# Patient Record
Sex: MALE | Race: WHITE | NOT HISPANIC OR LATINO | Employment: OTHER | URBAN - NONMETROPOLITAN AREA
[De-identification: names, ages, dates, MRNs, and addresses within clinical notes are randomized per-mention and may not be internally consistent; named-entity substitution may affect disease eponyms.]

---

## 2023-07-06 ENCOUNTER — APPOINTMENT (OUTPATIENT)
Dept: PRIMARY CARE | Facility: CLINIC | Age: 28
End: 2023-07-06
Payer: COMMERCIAL

## 2024-02-10 ENCOUNTER — OFFICE VISIT (OUTPATIENT)
Dept: URGENT CARE | Facility: CLINIC | Age: 29
End: 2024-02-10
Payer: COMMERCIAL

## 2024-02-10 VITALS
DIASTOLIC BLOOD PRESSURE: 82 MMHG | BODY MASS INDEX: 37.79 KG/M2 | HEART RATE: 115 BPM | SYSTOLIC BLOOD PRESSURE: 143 MMHG | WEIGHT: 279 LBS | OXYGEN SATURATION: 97 % | TEMPERATURE: 97.3 F | HEIGHT: 72 IN

## 2024-02-10 DIAGNOSIS — J06.9 ACUTE UPPER RESPIRATORY INFECTION: Primary | ICD-10-CM

## 2024-02-10 LAB
POC TRIPLEX FLU A-AG: NORMAL
POC TRIPLEX FLU B-AG: NORMAL
POC TRIPLEX SARSCOV-2 AG: NORMAL

## 2024-02-10 PROCEDURE — 87428 SARSCOV & INF VIR A&B AG IA: CPT | Performed by: NURSE PRACTITIONER

## 2024-02-10 PROCEDURE — 99213 OFFICE O/P EST LOW 20 MIN: CPT | Mod: 25 | Performed by: NURSE PRACTITIONER

## 2024-02-10 RX ORDER — AZITHROMYCIN 250 MG/1
TABLET, FILM COATED ORAL
Qty: 6 TABLET | Refills: 0 | Status: SHIPPED | OUTPATIENT
Start: 2024-02-10 | End: 2024-02-15

## 2024-02-10 RX ORDER — LAMOTRIGINE 100 MG/1
TABLET ORAL
COMMUNITY
Start: 2024-01-30

## 2024-02-10 RX ORDER — ALBUTEROL SULFATE 90 UG/1
2 AEROSOL, METERED RESPIRATORY (INHALATION) EVERY 6 HOURS PRN
Qty: 18 G | Refills: 0 | Status: SHIPPED | OUTPATIENT
Start: 2024-02-10 | End: 2025-02-09

## 2024-02-10 NOTE — PROGRESS NOTES
28 y.o. male presents for evaluation of URI.  Symptoms including cough, congestion, body aches, malaise, and headache have been present for 1 days and refractory to OTC meds.  No fever, chills, nausea, vomiting, abdominal pain, CP, or SOB.  No exacerbating factors. No known COVID 19/flu exposure.        Vitals:    02/10/24 0809   BP: 143/82   Pulse: (!) 115   Temp: 36.3 °C (97.3 °F)   SpO2: 97%       Allergies   Allergen Reactions    Prednisone Anaphylaxis    Amoxicillin Rash       Medication Documentation Review Audit       Reviewed by Marycarmen David MA (Medical Assistant) on 02/10/24 at 0811      Medication Order Taking? Sig Documenting Provider Last Dose Status   lamoTRIgine (LaMICtal) 100 mg tablet [id number] Yes  Historical Provider, MD Taking Active   lumateperone (Caplyta) 42 mg capsule [id number] Yes Take by mouth. Historical Provider, MD Taking Active                    Past Medical History:   Diagnosis Date    Bipolar affective disorder (CMS/[facility])        Past Surgical History:   Procedure Laterality Date    OTHER SURGICAL HISTORY  12/30/2020    Root canal procedure       ROS  See HPI    Physical Exam  Vitals and nursing note reviewed.   Constitutional:       Appearance: Normal appearance.   HENT:      Head: Normocephalic and atraumatic.      Right Ear: Tympanic membrane, ear canal and external ear normal.      Left Ear: Tympanic membrane, ear canal and external ear normal.      Nose: Congestion present.      Mouth/Throat:      Mouth: Mucous membranes are moist.      Pharynx: Oropharynx is clear.   Eyes:      Extraocular Movements: Extraocular movements intact.      Conjunctiva/sclera: Conjunctivae normal.      Pupils: Pupils are equal, round, and reactive to light.   Cardiovascular:      Rate and Rhythm: Normal rate and regular rhythm.      Pulses: Normal pulses.      Heart sounds: Normal heart sounds.   Pulmonary:      Effort: Pulmonary effort is normal.      Breath sounds: Normal breath sounds.    Lymphadenopathy:      Cervical: No cervical adenopathy.   Skin:     General: Skin is warm.      Capillary Refill: Capillary refill takes less than 2 seconds.   Neurological:      General: No focal deficit present.      Mental Status: He is alert and oriented to person, place, and time.   Psychiatric:         Mood and Affect: Mood normal.         Behavior: Behavior normal.       Recent Results (from the past 1 hour(s))   POCT BD Veritor Triplex Ag    Collection Time: 02/10/24  9:06 AM   Result Value Ref Range    POC Triplex SARS-CoV-2 Ag  Presumptive negative for Triplex SARS-CoV-2 (no antigen detected)     Presumptive negative for Triplex SARS-CoV-2 (no antigen detected)    POC Triplex Flu A-Ag  Presumptive negative for Triplex FLU A (no antigen detected)     Presumptive negative for Triplex FLU A (no antigen detected)    POC Triplex Flu B-Ag  Presumptive negative for Triplex FLU B (no antigen detected)     Presumptive negative for Triplex FLU B (no antigen detected)       Assessment/Plan/MDM  [facility] was seen today for uri.  Diagnoses and all orders for this visit:  Acute upper respiratory infection (Primary)  -     azithromycin (Zithromax Z-Michele) 250 mg tablet; Take 2 tablets (500 mg) on  Day 1,  followed by 1 tablet (250 mg) once daily on Days 2 through 5.  -     albuterol 90 mcg/actuation inhaler; Inhale 2 puffs every 6 hours if needed for wheezing.  -     POCT BD Veritor Triplex Ag    Encouraged pt to continue otc cold remedies PRN, push by mouth fluids and rest. Patient's clinical presentation is otherwise unremarkable at this time. Patient is discharged with instructions to follow-up with primary care or seek emergency medical attention for worsening symptoms or any new concerns.    Enrique Arita, CNP  [facility]  [phone]

## 2024-04-06 ENCOUNTER — OFFICE VISIT (OUTPATIENT)
Dept: URGENT CARE | Facility: CLINIC | Age: 29
End: 2024-04-06
Payer: COMMERCIAL

## 2024-04-06 VITALS
TEMPERATURE: 98.1 F | OXYGEN SATURATION: 97 % | SYSTOLIC BLOOD PRESSURE: 149 MMHG | BODY MASS INDEX: 40.88 KG/M2 | HEIGHT: 71 IN | HEART RATE: 91 BPM | WEIGHT: 292 LBS | DIASTOLIC BLOOD PRESSURE: 88 MMHG

## 2024-04-06 DIAGNOSIS — H10.31 ACUTE BACTERIAL CONJUNCTIVITIS OF RIGHT EYE: Primary | ICD-10-CM

## 2024-04-06 PROCEDURE — 99213 OFFICE O/P EST LOW 20 MIN: CPT | Performed by: NURSE PRACTITIONER

## 2024-04-06 RX ORDER — OFLOXACIN 3 MG/ML
1 SOLUTION/ DROPS OPHTHALMIC 3 TIMES DAILY
Qty: 5 ML | Refills: 0 | Status: SHIPPED | OUTPATIENT
Start: 2024-04-06 | End: 2024-04-13

## 2024-04-06 ASSESSMENT — VISUAL ACUITY: OU: 1

## 2024-04-06 NOTE — PROGRESS NOTES
28 y.o. male presents for evaluation of irritation and drainage from right eye that began a few days ago.  Denies nasal congestion, sore throat, cough, headache, visual changes, fever or any other associated symptom or complaint.  No OTC meds or symptom management.  No other complaints.      Vitals:    04/06/24 1120   BP: 149/88   Pulse: 91   Temp: 36.7 °C (98.1 °F)   SpO2: 97%       Allergies   Allergen Reactions    Prednisone Anaphylaxis    Amoxicillin Rash       Medication Documentation Review Audit       Reviewed by Marycarmen David MA (Medical Assistant) on 04/06/24 at 1122      Medication Order Taking? Sig Documenting Provider Last Dose Status   albuterol 90 mcg/actuation inhaler [id number] No Inhale 2 puffs every 6 hours if needed for wheezing.   Patient not taking: Reported on 4/6/2024    Enrique Arita, APRN-CNP Not Taking Active   lamoTRIgine (LaMICtal) 100 mg tablet [id number] No  Historical Provider, MD Not Taking Active   lumateperone (Caplyta) 42 mg capsule [id number] No Take by mouth. Historical Provider, MD Not Taking Active                    Past Medical History:   Diagnosis Date    Bipolar affective disorder (CMS/[facility])        Past Surgical History:   Procedure Laterality Date    OTHER SURGICAL HISTORY  12/30/2020    Root canal procedure       ROS  See HPI    Physical Exam  Vitals and nursing note reviewed.   Constitutional:       General: He is not in acute distress.     Appearance: Normal appearance. He is not ill-appearing or toxic-appearing.   HENT:      Head: Normocephalic and atraumatic.      Right Ear: Tympanic membrane, ear canal and external ear normal.      Left Ear: Tympanic membrane, ear canal and external ear normal.      Nose: Nose normal.      Mouth/Throat:      Mouth: Mucous membranes are moist.      Pharynx: Oropharynx is clear.   Eyes:      General: Lids are normal. Vision grossly intact. No scleral icterus.        Right eye: Discharge (conjunctiva erythematous) present.         Left  eye: No discharge.      Extraocular Movements: Extraocular movements intact.      Pupils: Pupils are equal, round, and reactive to light.   Cardiovascular:      Rate and Rhythm: Normal rate and regular rhythm.      Pulses: Normal pulses.      Heart sounds: Normal heart sounds.   Pulmonary:      Effort: Pulmonary effort is normal.      Breath sounds: Normal breath sounds.   Lymphadenopathy:      Cervical: No cervical adenopathy.   Skin:     General: Skin is warm.      Capillary Refill: Capillary refill takes less than 2 seconds.   Neurological:      General: No focal deficit present.      Mental Status: He is alert and oriented to person, place, and time.   Psychiatric:         Mood and Affect: Mood normal.         Behavior: Behavior normal.         Assessment/Plan/MDM  Augustin was seen today for conjunctivitis.  Diagnoses and all orders for this visit:  Acute bacterial conjunctivitis of right eye (Primary)  -     ofloxacin (Ocuflox) 0.3 % ophthalmic solution; Administer 1 drop into both eyes 3 times a day for 7 days.    Encouraged pt to continue otc cold remedies PRN, push by mouth fluids and rest. Patient's clinical presentation is otherwise unremarkable at this time. Patient is discharged with instructions to follow-up with primary care or seek emergency medical attention for worsening symptoms or any new concerns.    I did personally review Augustin's past medical history, surgical history, social history, as well as family history (when relevant).  In this case, I also oversaw the his drug management by reviewing his medication list, allergy list, as well as the medications that I prescribed during the UC course and/or recommended as an out-patient (including possible OTC medications such as acetaminophen, NSAIDs , etc).    After reviewing the items above, I did not look at previous medical documentation, such as recent hospitalizations, office visits, and/or recent consultations with PCP/specialist.                           SDOH:   Another factor that I considered in [facility]'s care was his Social Determinants of Health (SDOH). During this UC encounter, he did not have social determinants of health. Those SDOH influencing [facility]'s care are: none      Enrique Arita CNP  [facility]  [phone]

## 2024-08-28 ENCOUNTER — APPOINTMENT (OUTPATIENT)
Age: 29
End: 2024-08-28
Payer: COMMERCIAL

## 2024-08-28 VITALS
BODY MASS INDEX: 41.93 KG/M2 | DIASTOLIC BLOOD PRESSURE: 70 MMHG | HEART RATE: 110 BPM | WEIGHT: 299.5 LBS | SYSTOLIC BLOOD PRESSURE: 150 MMHG | OXYGEN SATURATION: 99 %

## 2024-08-28 DIAGNOSIS — R53.83 FATIGUE, UNSPECIFIED TYPE: Primary | ICD-10-CM

## 2024-08-28 DIAGNOSIS — R35.0 URINARY FREQUENCY: ICD-10-CM

## 2024-08-28 DIAGNOSIS — Z00.00 HEALTHCARE MAINTENANCE: ICD-10-CM

## 2024-08-28 DIAGNOSIS — G47.30 SLEEP APNEA, UNSPECIFIED TYPE: ICD-10-CM

## 2024-08-28 PROBLEM — F31.9 BIPOLAR 1 DISORDER (MULTI): Status: ACTIVE | Noted: 2023-07-05

## 2024-08-28 PROCEDURE — 99214 OFFICE O/P EST MOD 30 MIN: CPT | Performed by: FAMILY MEDICINE

## 2024-08-28 RX ORDER — NALTREXONE HYDROCHLORIDE 50 MG/1
50 TABLET, FILM COATED ORAL DAILY
COMMUNITY
Start: 2024-08-01

## 2024-08-28 RX ORDER — LUMATEPERONE 21 MG/1
CAPSULE ORAL
COMMUNITY
Start: 2024-06-20 | End: 2024-08-28 | Stop reason: WASHOUT

## 2024-08-28 NOTE — PROGRESS NOTES
Subjective   Augustin Gonzalez is a 29 y.o. male who presents for Fatigue.  Here c/o fatigue.  He states that he is exhausted all the time.  He stopped drinking about 3 years ago.  He thought the fatigue was related to drinking but it did not improve and in the past few months he has had worsening fatigue.  He fell asleep at his desk the other day.  He continues to see his counselor regularly.  He has had some issues with increased urination as well.      His sleep is hit or miss - some nights he feels like he sleeps well, others not.  He states that he cannot sleep on his back or else he feels like he can't breath.              Objective   Visit Vitals  /70   Pulse 110      Physical Exam  Vitals reviewed.   Constitutional:       General: He is not in acute distress.  Cardiovascular:      Rate and Rhythm: Normal rate and regular rhythm.      Heart sounds: No murmur heard.  Pulmonary:      Effort: Pulmonary effort is normal. No respiratory distress.      Breath sounds: Normal breath sounds.   Skin:     General: Skin is warm and dry.   Neurological:      General: No focal deficit present.      Mental Status: He is alert. Mental status is at baseline.         Assessment/Plan   Problem List Items Addressed This Visit    None  Visit Diagnoses       Fatigue, unspecified type    -  Primary    Relevant Orders    CBC and Auto Differential    Comprehensive Metabolic Panel    Hemoglobin A1C    Lipid Panel    TSH with reflex to Free T4 if abnormal    Vitamin B12    Vitamin D 25-Hydroxy,Total (for eval of Vitamin D levels)    Home sleep apnea test (HSAT)    Urinary frequency        Relevant Orders    CBC and Auto Differential    Comprehensive Metabolic Panel    Hemoglobin A1C    Lipid Panel    TSH with reflex to Free T4 if abnormal    Vitamin B12    Vitamin D 25-Hydroxy,Total (for eval of Vitamin D levels)    Urinalysis with Reflex Microscopic    Urine Culture    Healthcare maintenance        Relevant Orders    CBC and Auto  Differential    Comprehensive Metabolic Panel    Hemoglobin A1C    Lipid Panel    TSH with reflex to Free T4 if abnormal    Vitamin B12    Vitamin D 25-Hydroxy,Total (for eval of Vitamin D levels)    Sleep apnea, unspecified type        Relevant Orders    Home sleep apnea test (HSAT)               Trudy Asher MD

## 2024-08-29 ENCOUNTER — LAB (OUTPATIENT)
Facility: LAB | Age: 29
End: 2024-08-29
Payer: COMMERCIAL

## 2024-08-29 DIAGNOSIS — Z00.00 HEALTHCARE MAINTENANCE: ICD-10-CM

## 2024-08-29 DIAGNOSIS — R53.83 FATIGUE, UNSPECIFIED TYPE: ICD-10-CM

## 2024-08-29 DIAGNOSIS — R35.0 URINARY FREQUENCY: ICD-10-CM

## 2024-08-29 LAB
25(OH)D3 SERPL-MCNC: 7 NG/ML (ref 30–100)
ALBUMIN SERPL BCP-MCNC: 4.9 G/DL (ref 3.4–5)
ALP SERPL-CCNC: 54 U/L (ref 33–120)
ALT SERPL W P-5'-P-CCNC: 63 U/L (ref 10–52)
ANION GAP SERPL CALC-SCNC: 13 MMOL/L (ref 10–20)
APPEARANCE UR: CLEAR
AST SERPL W P-5'-P-CCNC: 36 U/L (ref 9–39)
BASOPHILS # BLD AUTO: 0.06 X10*3/UL (ref 0–0.1)
BASOPHILS NFR BLD AUTO: 0.7 %
BILIRUB SERPL-MCNC: 0.6 MG/DL (ref 0–1.2)
BILIRUB UR STRIP.AUTO-MCNC: NEGATIVE MG/DL
BUN SERPL-MCNC: 11 MG/DL (ref 6–23)
CALCIUM SERPL-MCNC: 9.5 MG/DL (ref 8.6–10.3)
CHLORIDE SERPL-SCNC: 102 MMOL/L (ref 98–107)
CHOLEST SERPL-MCNC: 178 MG/DL (ref 0–199)
CHOLESTEROL/HDL RATIO: 4.3
CO2 SERPL-SCNC: 26 MMOL/L (ref 21–32)
COLOR UR: YELLOW
CREAT SERPL-MCNC: 1.04 MG/DL (ref 0.5–1.3)
EGFRCR SERPLBLD CKD-EPI 2021: >90 ML/MIN/1.73M*2
EOSINOPHIL # BLD AUTO: 0.15 X10*3/UL (ref 0–0.7)
EOSINOPHIL NFR BLD AUTO: 1.8 %
ERYTHROCYTE [DISTWIDTH] IN BLOOD BY AUTOMATED COUNT: 12.7 % (ref 11.5–14.5)
EST. AVERAGE GLUCOSE BLD GHB EST-MCNC: 91 MG/DL
GLUCOSE SERPL-MCNC: 96 MG/DL (ref 74–99)
GLUCOSE UR STRIP.AUTO-MCNC: NORMAL MG/DL
HBA1C MFR BLD: 4.8 %
HCT VFR BLD AUTO: 49.1 % (ref 41–52)
HDLC SERPL-MCNC: 41 MG/DL
HGB BLD-MCNC: 15.9 G/DL (ref 13.5–17.5)
IMM GRANULOCYTES # BLD AUTO: 0.03 X10*3/UL (ref 0–0.7)
IMM GRANULOCYTES NFR BLD AUTO: 0.4 % (ref 0–0.9)
KETONES UR STRIP.AUTO-MCNC: NEGATIVE MG/DL
LDLC SERPL CALC-MCNC: 110 MG/DL
LEUKOCYTE ESTERASE UR QL STRIP.AUTO: NEGATIVE
LYMPHOCYTES # BLD AUTO: 1.66 X10*3/UL (ref 1.2–4.8)
LYMPHOCYTES NFR BLD AUTO: 20.2 %
MCH RBC QN AUTO: 28.2 PG (ref 26–34)
MCHC RBC AUTO-ENTMCNC: 32.4 G/DL (ref 32–36)
MCV RBC AUTO: 87 FL (ref 80–100)
MONOCYTES # BLD AUTO: 0.56 X10*3/UL (ref 0.1–1)
MONOCYTES NFR BLD AUTO: 6.8 %
NEUTROPHILS # BLD AUTO: 5.74 X10*3/UL (ref 1.2–7.7)
NEUTROPHILS NFR BLD AUTO: 70.1 %
NITRITE UR QL STRIP.AUTO: NEGATIVE
NON HDL CHOLESTEROL: 137 MG/DL (ref 0–149)
NRBC BLD-RTO: 0 /100 WBCS (ref 0–0)
PH UR STRIP.AUTO: 6 [PH]
PLATELET # BLD AUTO: 279 X10*3/UL (ref 150–450)
POTASSIUM SERPL-SCNC: 4.1 MMOL/L (ref 3.5–5.3)
PROT SERPL-MCNC: 7.2 G/DL (ref 6.4–8.2)
PROT UR STRIP.AUTO-MCNC: NEGATIVE MG/DL
RBC # BLD AUTO: 5.63 X10*6/UL (ref 4.5–5.9)
RBC # UR STRIP.AUTO: NEGATIVE /UL
SODIUM SERPL-SCNC: 137 MMOL/L (ref 136–145)
SP GR UR STRIP.AUTO: 1.02
TRIGL SERPL-MCNC: 134 MG/DL (ref 0–149)
TSH SERPL-ACNC: 1.05 MIU/L (ref 0.44–3.98)
UROBILINOGEN UR STRIP.AUTO-MCNC: NORMAL MG/DL
VIT B12 SERPL-MCNC: 212 PG/ML (ref 211–911)
VLDL: 27 MG/DL (ref 0–40)
WBC # BLD AUTO: 8.2 X10*3/UL (ref 4.4–11.3)

## 2024-08-29 PROCEDURE — 36415 COLL VENOUS BLD VENIPUNCTURE: CPT

## 2024-08-29 PROCEDURE — 82306 VITAMIN D 25 HYDROXY: CPT

## 2024-08-29 PROCEDURE — 87086 URINE CULTURE/COLONY COUNT: CPT

## 2024-08-29 PROCEDURE — 80061 LIPID PANEL: CPT

## 2024-08-29 PROCEDURE — 82607 VITAMIN B-12: CPT

## 2024-08-29 PROCEDURE — 81003 URINALYSIS AUTO W/O SCOPE: CPT

## 2024-08-29 PROCEDURE — 85025 COMPLETE CBC W/AUTO DIFF WBC: CPT

## 2024-08-29 PROCEDURE — 80053 COMPREHEN METABOLIC PANEL: CPT

## 2024-08-29 PROCEDURE — 84443 ASSAY THYROID STIM HORMONE: CPT

## 2024-08-29 PROCEDURE — 83036 HEMOGLOBIN GLYCOSYLATED A1C: CPT

## 2024-08-30 LAB — BACTERIA UR CULT: NORMAL

## 2024-09-03 ENCOUNTER — TELEPHONE (OUTPATIENT)
Age: 29
End: 2024-09-03

## 2024-09-03 ENCOUNTER — CLINICAL SUPPORT (OUTPATIENT)
Dept: SLEEP MEDICINE | Facility: HOSPITAL | Age: 29
End: 2024-09-03
Payer: COMMERCIAL

## 2024-09-03 VITALS — HEIGHT: 70 IN | BODY MASS INDEX: 42.8 KG/M2 | WEIGHT: 299 LBS

## 2024-09-03 DIAGNOSIS — G47.30 SLEEP APNEA, UNSPECIFIED TYPE: Primary | ICD-10-CM

## 2024-09-03 DIAGNOSIS — G47.30 SLEEP APNEA, UNSPECIFIED TYPE: ICD-10-CM

## 2024-09-03 DIAGNOSIS — G47.9 SLEEP DISORDER, UNSPECIFIED: ICD-10-CM

## 2024-09-03 DIAGNOSIS — R53.83 FATIGUE, UNSPECIFIED TYPE: ICD-10-CM

## 2024-09-03 PROCEDURE — 9420000001 HC RT PATIENT EDUCATION 5 MIN

## 2024-09-03 PROCEDURE — 95806 SLEEP STUDY UNATT&RESP EFFT: CPT | Performed by: STUDENT IN AN ORGANIZED HEALTH CARE EDUCATION/TRAINING PROGRAM

## 2024-09-03 NOTE — TELEPHONE ENCOUNTER
An in lab sleep study order popped up for me to do prior auth on and it looks like the patient picked up a home sleep study today. Did the procedure change?

## 2024-09-03 NOTE — TELEPHONE ENCOUNTER
Sorry, I didn't get a forwarded message. He evidently can do the HST because he picked it up today so I'll go ahead and cancel the in lab order.

## 2024-09-03 NOTE — TELEPHONE ENCOUNTER
I thought I forwarded you the message I got from someone who reviews sleep study requests - they said that he could not do the home sleep study due to his insurance.  If he is able to do the home sleep study that is fine.

## 2024-09-03 NOTE — PROGRESS NOTES
Type of Study: HOME SLEEP STUDY - NOMAD     The patient received equipment and instructions for use of the [employer] Nomad HSAT device. The patient was instructed how to apply the effort belts, cannula, thermistor. It was also explained how the Nomad and oximeter components work.  The patient was asked to record their sleep for an 8-hour period.  The patient was informed of their responsibility for the device and acknowledged this by signing the HSAT device contract. The patient was asked to return the device on the next day and was shown where the drop off box was located in the hospital.     After the procedure, the patient/family was informed to follow up with ordering clinician for testing results.

## 2024-09-25 ENCOUNTER — APPOINTMENT (OUTPATIENT)
Age: 29
End: 2024-09-25
Payer: COMMERCIAL

## 2024-09-25 VITALS
BODY MASS INDEX: 42.25 KG/M2 | HEIGHT: 70 IN | HEART RATE: 80 BPM | DIASTOLIC BLOOD PRESSURE: 82 MMHG | WEIGHT: 295.1 LBS | OXYGEN SATURATION: 97 % | SYSTOLIC BLOOD PRESSURE: 140 MMHG

## 2024-09-25 DIAGNOSIS — E55.9 VITAMIN D DEFICIENCY: Primary | ICD-10-CM

## 2024-09-25 PROCEDURE — 4004F PT TOBACCO SCREEN RCVD TLK: CPT | Performed by: FAMILY MEDICINE

## 2024-09-25 PROCEDURE — 3008F BODY MASS INDEX DOCD: CPT | Performed by: FAMILY MEDICINE

## 2024-09-25 PROCEDURE — 99213 OFFICE O/P EST LOW 20 MIN: CPT | Performed by: FAMILY MEDICINE

## 2024-09-25 RX ORDER — ERGOCALCIFEROL 1.25 MG/1
50000 CAPSULE ORAL 2 TIMES WEEKLY
Qty: 24 CAPSULE | Refills: 1 | Status: SHIPPED | OUTPATIENT
Start: 2024-09-25

## 2024-09-25 NOTE — PROGRESS NOTES
Subjective   Augustin Gonzalez is a 29 y.o. male who presents for Follow-up (Follow up - sleep study/blood work).  Here for follow up recent labs, sleep study, fatigue.    Reviewed his labs - vitamin D level was significantly low - we will start supplement for that.  Otherwise labs were ok.  Reviewed his recent sleep study and while it did not confirm sleep apnea we discussed some limitations of the test and options including testing in lab or referral to sleep medicine doctor vs working on some lifestyle changes and at this point he would like to work on lifestyle changes first and will let us know if he is interested in further pursuing this.              Objective   Visit Vitals  /82 (BP Location: Right arm, Patient Position: Sitting)   Pulse 80      Physical Exam  Vitals reviewed.   Constitutional:       General: He is not in acute distress.  Cardiovascular:      Rate and Rhythm: Normal rate and regular rhythm.      Heart sounds: No murmur heard.  Pulmonary:      Effort: Pulmonary effort is normal. No respiratory distress.      Breath sounds: Normal breath sounds.   Skin:     General: Skin is warm and dry.   Neurological:      General: No focal deficit present.      Mental Status: He is alert. Mental status is at baseline.         Assessment/Plan   Problem List Items Addressed This Visit    None  Visit Diagnoses       Vitamin D deficiency    -  Primary    Relevant Medications    ergocalciferol (Vitamin D-2) 1.25 MG (89117 UT) capsule    Other Relevant Orders    Vitamin D 25-Hydroxy,Total (for eval of Vitamin D levels)               Trudy Asher MD

## 2024-09-25 NOTE — PATIENT INSTRUCTIONS
Will start vitamin D.  Also discussed nicotine cessation.  He has taken bupropion in the past and did not tolerate that.  Discussed chantix and he will think about it/discuss with his psychiatrist.  Also discussed nicotine replacement.  Follow up here in 3 months, sooner if needed.

## 2024-12-16 ENCOUNTER — HOSPITAL ENCOUNTER (EMERGENCY)
Facility: HOSPITAL | Age: 29
Discharge: HOME | End: 2024-12-16
Attending: EMERGENCY MEDICINE

## 2024-12-16 VITALS
BODY MASS INDEX: 39.28 KG/M2 | DIASTOLIC BLOOD PRESSURE: 91 MMHG | TEMPERATURE: 97.5 F | SYSTOLIC BLOOD PRESSURE: 136 MMHG | OXYGEN SATURATION: 98 % | RESPIRATION RATE: 18 BRPM | WEIGHT: 290 LBS | HEIGHT: 72 IN | HEART RATE: 84 BPM

## 2024-12-16 DIAGNOSIS — F32.A DEPRESSION, UNSPECIFIED DEPRESSION TYPE: Primary | ICD-10-CM

## 2024-12-16 LAB
ALBUMIN SERPL BCP-MCNC: 4.7 G/DL (ref 3.4–5)
ALP SERPL-CCNC: 64 U/L (ref 33–120)
ALT SERPL W P-5'-P-CCNC: 91 U/L (ref 10–52)
AMPHETAMINES UR QL SCN: NORMAL
ANION GAP SERPL CALC-SCNC: 14 MMOL/L (ref 10–20)
APAP SERPL-MCNC: <10 UG/ML
AST SERPL W P-5'-P-CCNC: 32 U/L (ref 9–39)
BARBITURATES UR QL SCN: NORMAL
BASOPHILS # BLD AUTO: 0.09 X10*3/UL (ref 0–0.1)
BASOPHILS NFR BLD AUTO: 0.9 %
BENZODIAZ UR QL SCN: NORMAL
BILIRUB SERPL-MCNC: 0.4 MG/DL (ref 0–1.2)
BUN SERPL-MCNC: 6 MG/DL (ref 6–23)
BZE UR QL SCN: NORMAL
CALCIUM SERPL-MCNC: 10.2 MG/DL (ref 8.6–10.3)
CANNABINOIDS UR QL SCN: NORMAL
CHLORIDE SERPL-SCNC: 102 MMOL/L (ref 98–107)
CO2 SERPL-SCNC: 23 MMOL/L (ref 21–32)
CREAT SERPL-MCNC: 0.9 MG/DL (ref 0.5–1.3)
EGFRCR SERPLBLD CKD-EPI 2021: >90 ML/MIN/1.73M*2
EOSINOPHIL # BLD AUTO: 0.08 X10*3/UL (ref 0–0.7)
EOSINOPHIL NFR BLD AUTO: 0.8 %
ERYTHROCYTE [DISTWIDTH] IN BLOOD BY AUTOMATED COUNT: 13.3 % (ref 11.5–14.5)
ETHANOL SERPL-MCNC: <10 MG/DL
FENTANYL+NORFENTANYL UR QL SCN: NORMAL
GLUCOSE SERPL-MCNC: 101 MG/DL (ref 74–99)
HCT VFR BLD AUTO: 46.8 % (ref 41–52)
HGB BLD-MCNC: 15.7 G/DL (ref 13.5–17.5)
IMM GRANULOCYTES # BLD AUTO: 0.06 X10*3/UL (ref 0–0.7)
IMM GRANULOCYTES NFR BLD AUTO: 0.6 % (ref 0–0.9)
LYMPHOCYTES # BLD AUTO: 1.87 X10*3/UL (ref 1.2–4.8)
LYMPHOCYTES NFR BLD AUTO: 18.8 %
MCH RBC QN AUTO: 28.5 PG (ref 26–34)
MCHC RBC AUTO-ENTMCNC: 33.5 G/DL (ref 32–36)
MCV RBC AUTO: 85 FL (ref 80–100)
METHADONE UR QL SCN: NORMAL
MONOCYTES # BLD AUTO: 0.67 X10*3/UL (ref 0.1–1)
MONOCYTES NFR BLD AUTO: 6.7 %
NEUTROPHILS # BLD AUTO: 7.18 X10*3/UL (ref 1.2–7.7)
NEUTROPHILS NFR BLD AUTO: 72.2 %
NRBC BLD-RTO: 0 /100 WBCS (ref 0–0)
OPIATES UR QL SCN: NORMAL
OXYCODONE+OXYMORPHONE UR QL SCN: NORMAL
PCP UR QL SCN: NORMAL
PLATELET # BLD AUTO: 297 X10*3/UL (ref 150–450)
POTASSIUM SERPL-SCNC: 4.1 MMOL/L (ref 3.5–5.3)
PROT SERPL-MCNC: 7.4 G/DL (ref 6.4–8.2)
RBC # BLD AUTO: 5.5 X10*6/UL (ref 4.5–5.9)
SALICYLATES SERPL-MCNC: <3 MG/DL
SODIUM SERPL-SCNC: 135 MMOL/L (ref 136–145)
WBC # BLD AUTO: 10 X10*3/UL (ref 4.4–11.3)

## 2024-12-16 PROCEDURE — 85025 COMPLETE CBC W/AUTO DIFF WBC: CPT | Performed by: EMERGENCY MEDICINE

## 2024-12-16 PROCEDURE — 80053 COMPREHEN METABOLIC PANEL: CPT | Performed by: EMERGENCY MEDICINE

## 2024-12-16 PROCEDURE — 36415 COLL VENOUS BLD VENIPUNCTURE: CPT | Performed by: EMERGENCY MEDICINE

## 2024-12-16 PROCEDURE — 80320 DRUG SCREEN QUANTALCOHOLS: CPT | Performed by: EMERGENCY MEDICINE

## 2024-12-16 PROCEDURE — 99283 EMERGENCY DEPT VISIT LOW MDM: CPT | Performed by: EMERGENCY MEDICINE

## 2024-12-16 PROCEDURE — 80307 DRUG TEST PRSMV CHEM ANLYZR: CPT | Performed by: EMERGENCY MEDICINE

## 2024-12-16 PROCEDURE — 99285 EMERGENCY DEPT VISIT HI MDM: CPT | Performed by: EMERGENCY MEDICINE

## 2024-12-16 SDOH — HEALTH STABILITY: MENTAL HEALTH: BEHAVIORS/MOOD: CALM;SAD;TEARFUL

## 2024-12-16 SDOH — HEALTH STABILITY: MENTAL HEALTH

## 2024-12-16 SDOH — HEALTH STABILITY: MENTAL HEALTH: HAVE YOU BEEN THINKING ABOUT HOW YOU MIGHT DO THIS?: YES

## 2024-12-16 SDOH — SOCIAL STABILITY: SOCIAL NETWORK: EMOTIONAL SUPPORT GIVEN: REASSURE

## 2024-12-16 SDOH — HEALTH STABILITY: MENTAL HEALTH: NEEDS EXPRESSED: EMOTIONAL

## 2024-12-16 SDOH — SOCIAL STABILITY: SOCIAL NETWORK: EMOTIONAL SUPPORT GIVEN: PATIENT COUNSELING

## 2024-12-16 SDOH — HEALTH STABILITY: MENTAL HEALTH: BEHAVIORAL HEALTH(WDL): EXCEPTIONS TO WDL

## 2024-12-16 SDOH — HEALTH STABILITY: MENTAL HEALTH: BEHAVIORS/MOOD: CALM;COOPERATIVE;WITHDRAWN

## 2024-12-16 SDOH — HEALTH STABILITY: MENTAL HEALTH: HAVE YOU EVER DONE ANYTHING, STARTED TO DO ANYTHING, OR PREPARED TO DO ANYTHING TO END YOUR LIFE?: NO

## 2024-12-16 SDOH — HEALTH STABILITY: MENTAL HEALTH
HAVE YOU STARTED TO WORK OUT OR WORKED OUT THE DETAILS OF HOW TO KILL YOURSELF? DO YOU INTENT TO CARRY OUT THIS PLAN?: YES

## 2024-12-16 SDOH — HEALTH STABILITY: MENTAL HEALTH: CONTENT: UNREMARKABLE

## 2024-12-16 SDOH — HEALTH STABILITY: MENTAL HEALTH: BEHAVIORS/MOOD: CALM;COOPERATIVE

## 2024-12-16 SDOH — HEALTH STABILITY: MENTAL HEALTH: HAVE YOU WISHED YOU WERE DEAD OR WISHED YOU COULD GO TO SLEEP AND NOT WAKE UP?: YES

## 2024-12-16 SDOH — HEALTH STABILITY: MENTAL HEALTH: SUICIDE ASSESSMENT: ADULT (C-SSRS)

## 2024-12-16 SDOH — HEALTH STABILITY: MENTAL HEALTH: HAVE YOU HAD THESE THOUGHTS AND HAD SOME INTENTION OF ACTING ON THEM?: YES

## 2024-12-16 SDOH — HEALTH STABILITY: MENTAL HEALTH: HAVE YOU ACTUALLY HAD ANY THOUGHTS OF KILLING YOURSELF?: YES

## 2024-12-16 ASSESSMENT — COLUMBIA-SUICIDE SEVERITY RATING SCALE - C-SSRS
1. IN THE PAST MONTH, HAVE YOU WISHED YOU WERE DEAD OR WISHED YOU COULD GO TO SLEEP AND NOT WAKE UP?: YES
4. HAVE YOU HAD THESE THOUGHTS AND HAD SOME INTENTION OF ACTING ON THEM?: YES
5. HAVE YOU STARTED TO WORK OUT OR WORKED OUT THE DETAILS OF HOW TO KILL YOURSELF? DO YOU INTEND TO CARRY OUT THIS PLAN?: YES
6. HAVE YOU EVER DONE ANYTHING, STARTED TO DO ANYTHING, OR PREPARED TO DO ANYTHING TO END YOUR LIFE?: NO
2. HAVE YOU ACTUALLY HAD ANY THOUGHTS OF KILLING YOURSELF?: YES

## 2024-12-16 ASSESSMENT — PAIN SCALES - GENERAL
PAINLEVEL_OUTOF10: 0 - NO PAIN
PAINLEVEL_OUTOF10: 0 - NO PAIN

## 2024-12-16 ASSESSMENT — PAIN - FUNCTIONAL ASSESSMENT
PAIN_FUNCTIONAL_ASSESSMENT: 0-10
PAIN_FUNCTIONAL_ASSESSMENT: 0-10

## 2024-12-16 ASSESSMENT — PAIN DESCRIPTION - PROGRESSION: CLINICAL_PROGRESSION: NOT CHANGED

## 2024-12-16 NOTE — ED PROVIDER NOTES
HPI   No chief complaint on file.      Limitations to History: None    HPI: 29-year-old male presents with concern for depression and suicidal ideation.  Patient states that he has been struggling with this for a long time.  States that he has had thoughts of hurting himself.  Would plan on killing himself with medication.  States that he has been evaluated in the past.  Does follow with a therapist.    ------------------------------------------------------------------------------------------------------------------------------------------  Physical Exam:    VS: As documented in the triage note and EMR flowsheet from this visit were reviewed.    Appearance: Alert. cooperative,  in no acute distress.   Skin: Intact,  dry skin, no lesions, rash, petechiae or purpura.   Eyes: PERRLA, EOMs intact,  Conjunctiva pink with no redness or exudates.   HENT: Normocephalic, atraumatic. Nares patent. No intraoral lesions.   Neck: Supple, without meningismus. Trachea at midline. No lymphadenopathy.  Pulmonary: Clear bilaterally with good chest wall excursion. No rales, rhonchi or wheezing. No accessory muscle use or stridor.  Cardiac: Regular rate and rhythm, no rubs, murmurs, or gallops.   Abdomen: Abdomen is soft, nontender, and nondistended.  No palpable organomegaly.  No rebound or guarding.  No CVA tenderness. Nonsurgical abdomen.  Genitourinary: Exam deferred.  Musculoskeletal: Full range of motion.  Pulses full and equal. No cyanosis, clubbing, or edema.  Neurological:  Cranial nerves are grossly intact, grossly normal sensation, no weakness, no focal findings identified.  Psychiatric: Appropriate mood and affect.                Patient History   Past Medical History:   Diagnosis Date    Bipolar affective disorder (Multi)      Past Surgical History:   Procedure Laterality Date    OTHER SURGICAL HISTORY  12/30/2020    Root canal procedure     No family history on file.  Social History     Tobacco Use    Smoking status: Some  Days     Types: Cigarettes    Smokeless tobacco: Current   Substance Use Topics    Alcohol use: Not on file    Drug use: Never       Physical Exam   ED Triage Vitals   Temp Pulse Resp BP   -- -- -- --      SpO2 Temp src Heart Rate Source Patient Position   -- -- -- --      BP Location FiO2 (%)     -- --       Physical Exam      ED Course & MDM   Diagnoses as of 12/16/24 0608   Depression, unspecified depression type                 No data recorded                                 Medical Decision Making  Labs Reviewed  COMPREHENSIVE METABOLIC PANEL - Abnormal     Glucose                       101 (*)                Sodium                        135 (*)                Potassium                     4.1                    Chloride                      102                    Bicarbonate                   23                     Anion Gap                     14                     Urea Nitrogen                 6                      Creatinine                    0.90                   eGFR                          >90                    Calcium                       10.2                   Albumin                       4.7                    Alkaline Phosphatase          64                     Total Protein                 7.4                    AST                           32                     Bilirubin, Total              0.4                    ALT                           91 (*)              DRUG SCREEN,URINE - Normal     Amphetamine Screen, Urine                            Barbiturate Screen, Urine                            Benzodiazepines Screen, Urine                          Cannabinoid Screen, Urine                            Cocaine Metabolite Screen, Urine                          Fentanyl Screen, Urine                               Opiate Screen, Urine                                 Oxycodone Screen, Urine                              PCP Screen, Urine                                    Methadone Screen,  Urine                                  Narrative: Drug screen results are presumptive and should not be used to assess                   compliance with prescribed medication. Contact the performing [facility] laboratory                   to add-on definitive confirmatory testing if clinically indicated.                                    Toxicology screening results are reported qualitatively. The concentration must                   be greater than or equal to the cutoff to be reported as positive. The concentration                   at which the screening test can detect an individual drug or metabolite varies.                   The absence of expected drug(s) and/or drug metabolite(s) may indicate non-compliance,                   inappropriate timing of specimen collection relative to drug administration, poor drug                   absorption, diluted/adulterated urine, or limitations of testing. For medical purposes                   only; not valid for forensic use.                                    Interpretive questions should be directed to the laboratory medical directors.  ACUTE TOXICOLOGY PANEL, BLOOD - Normal     Acetaminophen                 <10.0                  Salicylate                    <3                     Alcohol                       <10                 CBC WITH AUTO DIFFERENTIAL    Medical Decision Making:    Patient appears well nontoxic.  Lab work unremarkable.  Patient medically cleared.  Evaluated by crisis counselor.  After long discussion it was determined patient was safe for home.  Will perform safety plan.  Will follow-up with daytime counselor tomorrow.  Asked to return for new or worsening symptoms.  Stable at time of discharge.    Differential Diagnoses Considered: Depression, anxiety, suicidal ideation    Escalation of Care:  Appropriate for discharge and follow-up with primary care.  Patient also advised on follow-up with psychiatrist and  counselor.          Procedure  Procedures     Chintan Oconnor, DO  12/16/24 0609

## 2024-12-26 ENCOUNTER — APPOINTMENT (OUTPATIENT)
Age: 29
End: 2024-12-26
Payer: COMMERCIAL